# Patient Record
(demographics unavailable — no encounter records)

---

## 2024-11-13 NOTE — HISTORY OF PRESENT ILLNESS
[de-identified] : 36y Male presents with atraumatic knee pain of 2 months. No prior knee injuries or issues.  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  2 weeks ago the knee began with associated swelling> he states the area of pain moves, it started medially on his right knee and sometimes involves the bilateral area above the patella. Pain is improved with rest but does not completely go away. At best 5-6/10 but with working 8-9/10.  He initially went to the ED [facility]. More recently, he was seen in [facility] ED where knee XR was taken. He reports that XR is normal and he was advised to take ibuprofen and referred to us.

## 2024-11-13 NOTE — HISTORY OF PRESENT ILLNESS
[de-identified] : 36y Male presents with atraumatic knee pain of 2 months. No prior knee injuries or issues.  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  2 weeks ago the knee began with associated swelling> he states the area of pain moves, it started medially on his right knee and sometimes involves the bilateral area above the patella. Pain is improved with rest but does not completely go away. At best 5-6/10 but with working 8-9/10.  He initially went to the ED [facility]. More recently, he was seen in [facility] ED where knee XR was taken. He reports that XR is normal and he was advised to take ibuprofen and referred to us.

## 2024-11-13 NOTE — PHYSICAL EXAM
[de-identified] :   Knee right   Inspection Skin: normal Effusion: mild Bursa swelling: none  Palpation Tenderness: mild Location: medial joint line  Crepitus: none. Defect: none. Popliteal fullness: negative.  Flexion Active ROM: normal Passive ROM: normal    Extension Normal    Straight Leg Raise- can perform Motor strength Flexion: 5/5 Extension: 5/5  Sensory index Normal.  ACL/PCL tests Lachman test: stable Anterior drawer: stable Posterior drawer: stable  MCL/LCL tests MCL laxity: stable LCL laxity: stable  Patellofemoral tests Patellar grind test: negative Patellar apprehension: negative  Meniscal tests Anjelica's test: positive Thessaly's: positive  [de-identified] : XR of Right Knee Date: 11/02/2024   Views: 4 Performed at [facility]: Yes, [facility] Impression:   INTERPRETATION: CLINICAL INDICATION: Right knee pain, evaluate for effusion TECHNIQUE: 4 views of the right knee. COMPARISON: None available. FINDINGS: No acute fracture. No dislocation. Joint spaces are maintained. No radiographic joint effusion is seen. IMPRESSION: Unremarkable radiographs of the right knee.  These images were personally reviewed with original findings documented as above.

## 2024-11-13 NOTE — DISCUSSION/SUMMARY
[de-identified] : 36y M presents for 10 weeks of right knee pain which started medially and at times includes suprapatellar pain. XR [facility] unremarkable. Positive Thessaly's and Anjelica's on exam. US in office revealed some quadriceps tendinosis likely incidental. Consistent with chronic injury to meniscus vs acute nondisplaced tear. Denies mechanical symptoms.  - Diclofenac 75mg BID PRN - Sleeve knee support - Script for PT 1-3x per week - HEP provided  - RTC 4 weeks. If no improvement in 4 weeks RTC for reassessment and plan for MRI.

## 2024-11-13 NOTE — PHYSICAL EXAM
[de-identified] :   Knee right   Inspection Skin: normal Effusion: mild Bursa swelling: none  Palpation Tenderness: mild Location: medial joint line  Crepitus: none. Defect: none. Popliteal fullness: negative.  Flexion Active ROM: normal Passive ROM: normal    Extension Normal    Straight Leg Raise- can perform Motor strength Flexion: 5/5 Extension: 5/5  Sensory index Normal.  ACL/PCL tests Lachman test: stable Anterior drawer: stable Posterior drawer: stable  MCL/LCL tests MCL laxity: stable LCL laxity: stable  Patellofemoral tests Patellar grind test: negative Patellar apprehension: negative  Meniscal tests Anjelica's test: positive Thessaly's: positive  [de-identified] : XR of Right Knee Date: 11/02/2024   Views: 4 Performed at [facility]: Yes, [facility] Impression:   INTERPRETATION: CLINICAL INDICATION: Right knee pain, evaluate for effusion TECHNIQUE: 4 views of the right knee. COMPARISON: None available. FINDINGS: No acute fracture. No dislocation. Joint spaces are maintained. No radiographic joint effusion is seen. IMPRESSION: Unremarkable radiographs of the right knee.  These images were personally reviewed with original findings documented as above.

## 2024-11-13 NOTE — DISCUSSION/SUMMARY
[de-identified] : 36y M presents for 10 weeks of right knee pain which started medially and at times includes suprapatellar pain. XR [facility] unremarkable. Positive Thessaly's and Anjelica's on exam. US in office revealed some quadriceps tendinosis likely incidental. Consistent with chronic injury to meniscus vs acute nondisplaced tear. Denies mechanical symptoms.  - Diclofenac 75mg BID PRN - Sleeve knee support - Script for PT 1-3x per week - HEP provided  - RTC 4 weeks. If no improvement in 4 weeks RTC for reassessment and plan for MRI.